# Patient Record
Sex: MALE | Race: WHITE | URBAN - METROPOLITAN AREA
[De-identification: names, ages, dates, MRNs, and addresses within clinical notes are randomized per-mention and may not be internally consistent; named-entity substitution may affect disease eponyms.]

---

## 2017-01-01 ENCOUNTER — THERAPY VISIT (OUTPATIENT)
Dept: PHYSICAL THERAPY | Facility: CLINIC | Age: 82
End: 2017-01-01
Payer: MEDICARE

## 2017-01-01 DIAGNOSIS — M54.50 CHRONIC RIGHT-SIDED LOW BACK PAIN WITHOUT SCIATICA: Primary | ICD-10-CM

## 2017-01-01 DIAGNOSIS — G89.29 CHRONIC RIGHT-SIDED LOW BACK PAIN WITHOUT SCIATICA: Primary | ICD-10-CM

## 2017-01-01 PROCEDURE — 97161 PT EVAL LOW COMPLEX 20 MIN: CPT | Mod: GP | Performed by: PHYSICAL THERAPIST

## 2017-01-01 PROCEDURE — 97110 THERAPEUTIC EXERCISES: CPT | Mod: GP | Performed by: PHYSICAL THERAPIST

## 2017-01-01 PROCEDURE — G8979 MOBILITY GOAL STATUS: HCPCS | Mod: GP | Performed by: PHYSICAL THERAPIST

## 2017-01-01 PROCEDURE — G8978 MOBILITY CURRENT STATUS: HCPCS | Mod: GP | Performed by: PHYSICAL THERAPIST

## 2017-07-19 PROBLEM — M54.50 CHRONIC RIGHT-SIDED LOW BACK PAIN WITHOUT SCIATICA: Status: ACTIVE | Noted: 2017-01-01

## 2017-07-19 PROBLEM — G89.29 CHRONIC RIGHT-SIDED LOW BACK PAIN WITHOUT SCIATICA: Status: ACTIVE | Noted: 2017-01-01

## 2017-07-19 NOTE — MR AVS SNAPSHOT
"              After Visit Summary   7/19/2017    Bharat Kimbrough    MRN: [id number]           Patient Information     Date Of Birth          11/9/1929        Visit Information        Provider Department      7/19/2017 12:50 PM Cathy Shafer, PT [facility] Physical Therapy        Today's Diagnoses     Chronic right-sided low back pain without sciatica    -  1       Follow-ups after your visit        Your next 10 appointments already scheduled     Jul 28, 2017  2:50 PM CDT   AC Spine with Cathy Shafer PT   [facility] [place] [place] [facility] ([facility])    [address]  [facility] [id number] [phone]              Who to contact     If you have questions or need follow up information about today's clinic visit or your schedule please contact [facility] directly at [phone].  Normal or non-critical lab and imaging results will be communicated to you by [email or web address], letter or phone within 4 business days after the clinic has received the results. If you do not hear from us within 7 days, please contact the clinic through [email or web address] or phone. If you have a critical or abnormal lab result, we will notify you by phone as soon as possible.  Submit refill requests through [employer]ing[employer] or call your pharmacy and they will forward the refill request to us. Please allow 3 business days for your refill to be completed.          Additional Information About Your Visit        [email or web address] Information     Zingaya lets you send messages to your doctor, view your test results, renew your prescriptions, schedule appointments and more. To sign up, go to [email or web address] . Click on \"Log in\" on the left side of the screen, which will take you to the Welcome page. Then click on \"Sign up Now\" on the right side of the page.     You will be asked to enter the access code listed below, as well as some personal " information. Please follow the directions to create your username and password.     Your access code is: [email or web address]  Expires: 10/17/2017  2:26 PM     Your access code will  in 90 days. If you need help or a new code, please call your [place] clinic or [phone].        Care EveryWhere ID     This is your Care EveryWhere ID. This could be used by other organizations to access your [place] medical records  [id number]         Blood Pressure from Last 3 Encounters:   No data found for BP    Weight from Last 3 Encounters:   No data found for Wt              We Performed the Following     HC PT EVAL, LOW COMPLEXITY     AC CERT REPORT     AC INITIAL EVAL REPORT     THERAPEUTIC EXERCISES        Primary Care Provider    None Specified       No primary provider on file.        Equal Access to Services     CASEY CHUA : Marty Tariq, wastu akinsadaha, qaybta kaalmada georgiayajose daniel, rehana darling . So [facility] [phone].    ATENCIÓN: Si habla español, tiene a wells disposición servicios gratuitos de asistencia lingüística. Llame al [phone].    We comply with applicable federal civil rights laws and [place] laws. We do not discriminate on the basis of race, color, national origin, age, disability sex, sexual orientation or gender identity.            Thank you!     Thank you for choosing [facility] PHYSICAL THERAPY  for your care. Our goal is always to provide you with excellent care. Hearing back from our patients is one way we can continue to improve our services. Please take a few minutes to complete the written survey that you may receive in the mail after your visit with us. Thank you!             Your Updated Medication List - Protect others around you: Learn how to safely use, store and throw away your medicines at www.disposemymeds.org.      Notice  As of 2017  2:26 PM    You have not been prescribed any medications.

## 2017-07-19 NOTE — LETTER
DEPARTMENT OF HEALTH AND HUMAN SERVICES  CENTERS FOR MEDICARE & MEDICAID SERVICES    PLAN/UPDATED PLAN OF PROGRESS FOR OUTPATIENT REHABILITATION    PATIENTS NAME:  Bharat Kimbrough   : 1929  PR[facility] NUMBER:    [id number]  [facility]     [facility]: [place] [facility]    MEDICAL RECORD NUMBER: [id number]   START OF CARE DATE:  SOC Date: 17   TYPE:  PT  PRIMARY/TREATMENT DIAGNOSIS: (Pertinent Medical Diagnosis)  Chronic right-sided low back pain without sciatica  VISITS FROM START OF CARE:  Rxs Used: 1     [facility] Initial Evaluation  Subjective:  Patient is a 87 year old male presenting with rehab back hpi. The history is provided by the patient. No  was used.   Bharat Kimbrough is a 87 year old male with a lumbar condition.  Condition occurred with:  Insidious onset.  Condition occurred: for unknown reasons.  This is a chronic condition  Bharat saw Dr. Phipps on 17 with c/o LBP and R hip pain.  He was referred to PT for evaluation and treatment.  Henry reports a long history of low back and R hip problems most prominently with mid-back rods placed in ~ (pt unsure of levels) and R HEIDI in . Henry also reports that this crurent LBP and back/hip pain started slowly and have gotten progressively worse.  Chief complaints: pain with standing, walking and declining balance.  No pain with sitting. .    Patient reports pain:  Lower lumbar spine and central lumbar spine.  Radiates to:  No radiation.  Pain is described as aching and is intermittent and reported as 4/10.  Associated symptoms:  Loss of strength. Pain is worse during the day (activity-dependent).  Symptoms are exacerbated by walking and standing and relieved by rest and NSAID's.  Since onset symptoms are unchanged.        General health as reported by patient is good.  Pertinent medical history includes:  Stroke and high blood pressure.  Medical  allergies: no.  Surgical history: see above.  Current medications:  None as reported by the patient.  Current occupation is retired.    Primary job tasks include:  Prolonged sitting.  Barriers include:  None as reported by the patient.  Red flags:  None as reported by the patient.  Objective:  Standing Alignment:    Cervical/Thoracic:  Thoracic kyphosis increased and forward head  Lumbar:  Lordosis decr and posterior pelvic tilt  Gait:  Slow and shuffled steps, flexed lumbar spine  Gait Type:  Antalgic   Assistive Devices:  Walker  Lumbar/SI Evaluation  Lumbar Dermtomes:  Lumbar dermatomes: pt denied LE sensory sx.    Ting Lumbar Evaluation  Posture:  Sitting: poor  Standing: poor  Lordosis: Reduced  Lateral Shift: no  Correction of Posture: better  Other Observations: pt able to assume prone position with min A, mod A required for return to stand, prone ex not given for HEP.  Also pt unable to perform ext in prone d/t R shoulder issues.    Movement Loss:  Flexion (Flex): nil and pain  Extension (EXT): major  Side Glide R (SG R): mod  Side Glide L (SG L): mod  Test Movements:  EIS: During: decreases  After: no effect  Mechanical Response: no effect  Repeat EIS: During: decreases  After: better  Mechanical Response: IncROM  EIL: During: decreases  After: no effect  Mechanical Response: no effect  Conclusion lumbar: posture vs derangement.  Principle of Treatment:  Posture Correction: lumbar roll in sitting, butt to back of chair  Extension: standing extension with back supported on counter, every 2-3 hours x10  Assessment/Plan:    Patient is a 87 year old male with lumbar complaints.    Patient has the following significant findings with corresponding treatment plan.                Diagnosis 1: R-sided LBP without sciatica  Pain -  hot/cold therapy, manual therapy, self management, education, directional preference exercise and home program  Decreased ROM/flexibility - manual therapy, therapeutic exercise and  home program  Decreased joint mobility - manual therapy, therapeutic exercise and home program  Impaired balance - neuro re-education, therapeutic activities and home program  Impaired gait - gait training  Impaired muscle performance - neuro re-education and home program  Decreased function - therapeutic activities and home program  Impaired posture - neuro re-education and home program    Therapy Evaluation Codes:   1) History comprised of:   Personal factors that impact the plan of care:      None.    Comorbidity factors that impact the plan of care are:      None.     Medications impacting care: None.  2) Examination of Body Systems comprised of:   Body structures and functions that impact the plan of care:      Hip and Lumbar spine.   Activity limitations that impact the plan of care are:      Sitting, Standing and Walking.  3) Clinical presentation characteristics are:   Stable/Uncomplicated.  4) Decision-Making    Low complexity using standardized patient assessment instrument and/or measureable assessment of functional outcome.  Cumulative Therapy Evaluation is: Low complexity.    Previous and current functional limitations:  (See Goal Flow Sheet for this information)    Short term and Long term goals: (See Goal Flow Sheet for this information)     Communication ability:  Patient appears to be able to clearly communicate and understand verbal and written communication and follow directions correctly.  Treatment Explanation - The following has been discussed with the patient:   RX ordered/plan of care  Anticipated outcomes  Possible risks and side effects  This patient would benefit from PT intervention to resume normal activities.   Rehab potential is excellent.  Frequency:  1 X week, once daily  Duration:  for 6 weeks  Discharge Plan:  Achieve all LTG.  Independent in home treatment program.  Reach maximal therapeutic benefit.    Caregiver Signature/Credentials _____________________________ Date  "________       Treating Provider: Cathy Shafer DPT   I have reviewed and certified the need for these services and plan of treatment while under my care.        PHYSICIAN'S SIGNATURE:   _________________________________________  Date___________   Cuco Phipps    Certification period:  Beginning of Cert date period: 07/19/17 to  End of Cert period date: 10/16/17     Functional Level Progress Report: Please see attached \"Goal Flow sheet for Functional level.\"    ____X____ Continue Services or       ________ DC Services                Service dates: From  SOC Date: 07/19/17 date to present                         "

## 2017-07-19 NOTE — PROGRESS NOTES
[facility] Initial Evaluation      Subjective:    Patient is a 87 year old male presenting with rehab back hpi. The history is provided by the patient. No  was used.   Bharat Kimbrough is a 87 year old male with a lumbar condition.  Condition occurred with:  Insidious onset.  Condition occurred: for unknown reasons.  This is a chronic condition  Bharat saw Dr. Phipps on 7/5/17 with c/o LBP and R hip pain.  He was referred to PT for evaluation and treatment.  Henry reports a long history of low back and R hip problems most prominently with mid-back rods placed in ~2000 (pt unsure of levels) and R HEIDI in 2007. Henry also reports that this crurent LBP and back/hip pain started slowly and have gotten progressively worse.  Chief complaints: pain with standing, walking and declining balance.  No pain with sitting. .    Patient reports pain:  Lower lumbar spine and central lumbar spine.  Radiates to:  No radiation.  Pain is described as aching and is intermittent and reported as 4/10.  Associated symptoms:  Loss of strength. Pain is worse during the day (activity-dependent).  Symptoms are exacerbated by walking and standing and relieved by rest and NSAID's.  Since onset symptoms are unchanged.        General health as reported by patient is good.  Pertinent medical history includes:  Stroke and high blood pressure.  Medical allergies: no.  Surgical history: see above.  Current medications:  None as reported by the patient.  Current occupation is retired.    Primary job tasks include:  Prolonged sitting.    Barriers include:  None as reported by the patient.    Red flags:  None as reported by the patient.                        Objective:    Standing Alignment:    Cervical/Thoracic:  Thoracic kyphosis increased and forward head    Lumbar:  Lordosis decr and posterior pelvic tilt            Gait:  Slow and shuffled steps, flexed lumbar spine  Gait Type:  Antalgic   Assistive Devices:   Walker                 Lumbar/SI Evaluation          Lumbar Dermtomes:  Lumbar dermatomes: pt denied LE sensory sx.                                                                         Ting Lumbar Evaluation    Posture:  Sitting: poor  Standing: poor  Lordosis: Reduced  Lateral Shift: no  Correction of Posture: better  Other Observations: pt able to assume prone position with min A, mod A required for return to stand, prone ex not given for HEP.  Also pt unable to perform ext in prone d/t R shoulder issues.    Movement Loss:  Flexion (Flex): nil and pain  Extension (EXT): major  Side Glide R (SG R): mod  Side Glide L (SG L): mod  Test Movements:    EIS: During: decreases  After: no effect  Mechanical Response: no effect  Repeat EIS: During: decreases  After: better  Mechanical Response: IncROM    EIL: During: decreases  After: no effect  Mechanical Response: no effect          Conclusion lumbar: posture vs derangement.  Principle of Treatment:  Posture Correction: lumbar roll in sitting, butt to back of chair    Extension: standing extension with back supported on counter, every 2-3 hours x10                                           ROS    Assessment/Plan:      Patient is a 87 year old male with lumbar complaints.    Patient has the following significant findings with corresponding treatment plan.                Diagnosis 1: R-sided LBP without sciatica  Pain -  hot/cold therapy, manual therapy, self management, education, directional preference exercise and home program  Decreased ROM/flexibility - manual therapy, therapeutic exercise and home program  Decreased joint mobility - manual therapy, therapeutic exercise and home program  Impaired balance - neuro re-education, therapeutic activities and home program  Impaired gait - gait training  Impaired muscle performance - neuro re-education and home program  Decreased function - therapeutic activities and home program  Impaired posture - neuro re-education  and home program    Therapy Evaluation Codes:   1) History comprised of:   Personal factors that impact the plan of care:      None.    Comorbidity factors that impact the plan of care are:      None.     Medications impacting care: None.  2) Examination of Body Systems comprised of:   Body structures and functions that impact the plan of care:      Hip and Lumbar spine.   Activity limitations that impact the plan of care are:      Sitting, Standing and Walking.  3) Clinical presentation characteristics are:   Stable/Uncomplicated.  4) Decision-Making    Low complexity using standardized patient assessment instrument and/or measureable assessment of functional outcome.  Cumulative Therapy Evaluation is: Low complexity.    Previous and current functional limitations:  (See Goal Flow Sheet for this information)    Short term and Long term goals: (See Goal Flow Sheet for this information)     Communication ability:  Patient appears to be able to clearly communicate and understand verbal and written communication and follow directions correctly.  Treatment Explanation - The following has been discussed with the patient:   RX ordered/plan of care  Anticipated outcomes  Possible risks and side effects  This patient would benefit from PT intervention to resume normal activities.   Rehab potential is excellent.    Frequency:  1 X week, once daily  Duration:  for 6 weeks  Discharge Plan:  Achieve all LTG.  Independent in home treatment program.  Reach maximal therapeutic benefit.    Please refer to the daily flowsheet for treatment today, total treatment time and time spent performing 1:1 timed codes.

## 2017-12-05 PROBLEM — M54.50 CHRONIC RIGHT-SIDED LOW BACK PAIN WITHOUT SCIATICA: Status: RESOLVED | Noted: 2017-01-01 | Resolved: 2017-01-01

## 2017-12-05 PROBLEM — G89.29 CHRONIC RIGHT-SIDED LOW BACK PAIN WITHOUT SCIATICA: Status: RESOLVED | Noted: 2017-01-01 | Resolved: 2017-01-01

## 2017-12-05 NOTE — PROGRESS NOTES
Bharat did not return to PT for follow up visits.  Current status is unknown.  Discharge at this time.